# Patient Record
Sex: FEMALE | Race: WHITE | NOT HISPANIC OR LATINO | Employment: FULL TIME | URBAN - METROPOLITAN AREA
[De-identification: names, ages, dates, MRNs, and addresses within clinical notes are randomized per-mention and may not be internally consistent; named-entity substitution may affect disease eponyms.]

---

## 2023-07-20 ENCOUNTER — APPOINTMENT (OUTPATIENT)
Dept: LAB | Facility: LAB | Age: 53
End: 2023-07-20
Payer: COMMERCIAL

## 2023-07-20 LAB
ALANINE AMINOTRANSFERASE (SGPT) (U/L) IN SER/PLAS: 13 U/L (ref 7–45)
ALBUMIN (G/DL) IN SER/PLAS: 4.1 G/DL (ref 3.4–5)
ALKALINE PHOSPHATASE (U/L) IN SER/PLAS: 45 U/L (ref 33–110)
ANION GAP IN SER/PLAS: 14 MMOL/L (ref 10–20)
ASPARTATE AMINOTRANSFERASE (SGOT) (U/L) IN SER/PLAS: 17 U/L (ref 9–39)
BILIRUBIN TOTAL (MG/DL) IN SER/PLAS: 0.4 MG/DL (ref 0–1.2)
C REACTIVE PROTEIN (MG/L) IN SER/PLAS: 0.73 MG/DL
CALCIUM (MG/DL) IN SER/PLAS: 9.5 MG/DL (ref 8.6–10.6)
CARBON DIOXIDE, TOTAL (MMOL/L) IN SER/PLAS: 23 MMOL/L (ref 21–32)
CHLORIDE (MMOL/L) IN SER/PLAS: 107 MMOL/L (ref 98–107)
CREATININE (MG/DL) IN SER/PLAS: 0.69 MG/DL (ref 0.5–1.05)
ERYTHROCYTE DISTRIBUTION WIDTH (RATIO) BY AUTOMATED COUNT: 15.1 % (ref 11.5–14.5)
ERYTHROCYTE MEAN CORPUSCULAR HEMOGLOBIN CONCENTRATION (G/DL) BY AUTOMATED: 32 G/DL (ref 32–36)
ERYTHROCYTE MEAN CORPUSCULAR VOLUME (FL) BY AUTOMATED COUNT: 99 FL (ref 80–100)
ERYTHROCYTES (10*6/UL) IN BLOOD BY AUTOMATED COUNT: 4.14 X10E12/L (ref 4–5.2)
GFR FEMALE: >90 ML/MIN/1.73M2
GLUCOSE (MG/DL) IN SER/PLAS: 94 MG/DL (ref 74–99)
HEMATOCRIT (%) IN BLOOD BY AUTOMATED COUNT: 41 % (ref 36–46)
HEMOGLOBIN (G/DL) IN BLOOD: 13.1 G/DL (ref 12–16)
LEUKOCYTES (10*3/UL) IN BLOOD BY AUTOMATED COUNT: 6.6 X10E9/L (ref 4.4–11.3)
NRBC (PER 100 WBCS) BY AUTOMATED COUNT: 0 /100 WBC (ref 0–0)
PLATELETS (10*3/UL) IN BLOOD AUTOMATED COUNT: 288 X10E9/L (ref 150–450)
POTASSIUM (MMOL/L) IN SER/PLAS: 4.2 MMOL/L (ref 3.5–5.3)
PROTEIN TOTAL: 6.9 G/DL (ref 6.4–8.2)
SEDIMENTATION RATE, ERYTHROCYTE: 8 MM/H (ref 0–20)
SODIUM (MMOL/L) IN SER/PLAS: 140 MMOL/L (ref 136–145)
UREA NITROGEN (MG/DL) IN SER/PLAS: 18 MG/DL (ref 6–23)

## 2023-07-23 LAB
NIL(NEG) CONTROL SPOT COUNT: NORMAL
PANEL A SPOT COUNT: 0
PANEL B SPOT COUNT: 1
POS CONTROL SPOT COUNT: NORMAL
T-SPOT. TB INTERPRETATION: NEGATIVE

## 2023-10-19 PROBLEM — H02.889 MEIBOMIAN GLAND DYSFUNCTION (MGD): Status: ACTIVE | Noted: 2023-10-19

## 2023-10-19 PROBLEM — H00.12 CHALAZION OF RIGHT LOWER EYELID: Status: ACTIVE | Noted: 2023-10-19

## 2023-10-19 PROBLEM — H52.4 PRESBYOPIA: Status: ACTIVE | Noted: 2023-10-19

## 2023-10-19 PROBLEM — M18.9 CMC ARTHRITIS, THUMB, DEGENERATIVE: Status: ACTIVE | Noted: 2023-10-19

## 2023-10-19 PROBLEM — M43.06 PARS DEFECT OF LUMBAR SPINE: Status: ACTIVE | Noted: 2023-10-19

## 2023-10-19 PROBLEM — M70.62 GREATER TROCHANTERIC BURSITIS, LEFT: Status: ACTIVE | Noted: 2023-10-19

## 2023-10-19 PROBLEM — S30.0XXA LUMBAR CONTUSION: Status: ACTIVE | Noted: 2023-10-19

## 2023-10-19 PROBLEM — H52.01 HYPERMETROPIA OF RIGHT EYE: Status: ACTIVE | Noted: 2023-10-19

## 2023-10-19 PROBLEM — H10.509 BLEPHAROCONJUNCTIVITIS: Status: ACTIVE | Noted: 2023-10-19

## 2023-10-19 PROBLEM — R29.898 LEFT HAND WEAKNESS: Status: ACTIVE | Noted: 2023-10-19

## 2023-10-19 PROBLEM — K63.5 POLYP OF COLON: Status: ACTIVE | Noted: 2022-08-18

## 2023-10-19 PROBLEM — M19.90 INFLAMMATORY ARTHRITIS: Status: ACTIVE | Noted: 2023-10-19

## 2023-10-19 PROBLEM — M50.20 HERNIATION OF INTERVERTEBRAL DISC OF CERVICAL REGION: Status: ACTIVE | Noted: 2023-10-19

## 2023-10-19 PROBLEM — M06.031: Status: ACTIVE | Noted: 2023-10-19

## 2023-10-19 PROBLEM — M06.00 SERONEGATIVE RHEUMATOID ARTHRITIS (MULTI): Status: ACTIVE | Noted: 2022-03-30

## 2023-10-19 PROBLEM — K64.8 HEMORRHOIDS, INTERNAL: Status: ACTIVE | Noted: 2022-08-18

## 2023-10-19 RX ORDER — FOLIC ACID 0.4 MG
1 TABLET ORAL DAILY
COMMUNITY
End: 2024-01-26 | Stop reason: SDUPTHER

## 2023-10-19 RX ORDER — FOLIC ACID 1 MG/1
1 TABLET ORAL DAILY
COMMUNITY

## 2023-10-19 RX ORDER — TURMERIC 400 MG
1 CAPSULE ORAL DAILY
COMMUNITY
Start: 2023-07-28

## 2023-10-19 RX ORDER — LEVONORGESTREL / ETHINYL ESTRADIOL AND ETHINYL ESTRADIOL 150-30(84)
KIT ORAL
COMMUNITY
End: 2024-01-26 | Stop reason: ALTCHOICE

## 2023-10-19 RX ORDER — CIPROFLOXACIN HYDROCHLORIDE 3 MG/ML
SOLUTION/ DROPS OPHTHALMIC 2 TIMES DAILY
COMMUNITY
Start: 2022-10-20 | End: 2024-01-26 | Stop reason: ALTCHOICE

## 2023-10-19 RX ORDER — MELOXICAM 15 MG/1
1 TABLET ORAL DAILY
COMMUNITY

## 2023-10-19 RX ORDER — METHOTREXATE 2.5 MG/1
25 TABLET ORAL
COMMUNITY
End: 2024-05-28

## 2023-10-19 RX ORDER — LEVONORGESTREL / ETHINYL ESTRADIOL AND ETHINYL ESTRADIOL 150-30(84)
1 KIT ORAL DAILY
COMMUNITY
Start: 2023-05-01 | End: 2023-11-08 | Stop reason: SDUPTHER

## 2023-10-19 RX ORDER — LEVONORGESTREL / ETHINYL ESTRADIOL AND ETHINYL ESTRADIOL 150-30(84)
1 KIT ORAL DAILY
COMMUNITY
Start: 2018-09-12 | End: 2024-03-06 | Stop reason: SDUPTHER

## 2023-10-19 RX ORDER — UPADACITINIB 15 MG/1
15 TABLET, EXTENDED RELEASE ORAL DAILY
COMMUNITY
Start: 2022-08-08 | End: 2023-12-06

## 2023-10-20 ENCOUNTER — OFFICE VISIT (OUTPATIENT)
Dept: DERMATOLOGY | Facility: CLINIC | Age: 53
End: 2023-10-20
Payer: COMMERCIAL

## 2023-10-20 DIAGNOSIS — L81.4 LENTIGO: ICD-10-CM

## 2023-10-20 DIAGNOSIS — D48.5 NEOPLASM OF UNCERTAIN BEHAVIOR OF SKIN: Primary | ICD-10-CM

## 2023-10-20 DIAGNOSIS — L82.0 INFLAMED SEBORRHEIC KERATOSIS: ICD-10-CM

## 2023-10-20 DIAGNOSIS — L57.8 DIFFUSE PHOTODAMAGE OF SKIN: ICD-10-CM

## 2023-10-20 DIAGNOSIS — L30.4 INTERTRIGO: ICD-10-CM

## 2023-10-20 DIAGNOSIS — L82.1 SEBORRHEIC KERATOSIS: ICD-10-CM

## 2023-10-20 DIAGNOSIS — D22.5 MELANOCYTIC NEVUS OF TRUNK: ICD-10-CM

## 2023-10-20 PROCEDURE — 99204 OFFICE O/P NEW MOD 45 MIN: CPT | Performed by: DERMATOLOGY

## 2023-10-20 PROCEDURE — 88305 TISSUE EXAM BY PATHOLOGIST: CPT | Mod: TC,DER | Performed by: DERMATOLOGY

## 2023-10-20 PROCEDURE — 1036F TOBACCO NON-USER: CPT | Performed by: DERMATOLOGY

## 2023-10-20 PROCEDURE — 17110 DESTRUCTION B9 LES UP TO 14: CPT | Performed by: DERMATOLOGY

## 2023-10-20 PROCEDURE — 11301 SHAVE SKIN LESION 0.6-1.0 CM: CPT | Performed by: DERMATOLOGY

## 2023-10-20 PROCEDURE — 11302 SHAVE SKIN LESION 1.1-2.0 CM: CPT | Performed by: DERMATOLOGY

## 2023-10-20 PROCEDURE — 88305 TISSUE EXAM BY PATHOLOGIST: CPT | Performed by: DERMATOLOGY

## 2023-10-20 RX ORDER — KETOCONAZOLE 20 MG/G
CREAM TOPICAL 2 TIMES DAILY
Qty: 60 G | Refills: 11 | Status: SHIPPED | OUTPATIENT
Start: 2023-10-20 | End: 2023-11-17

## 2023-10-20 ASSESSMENT — DERMATOLOGY PATIENT ASSESSMENT
HAVE YOU HAD OR DO YOU HAVE A STAPH INFECTION: NO
DO YOU USE SUNSCREEN: OCCASIONALLY
DO YOU USE A TANNING BED: NO
ARE YOU ON BIRTH CONTROL: NO
DO YOU HAVE IRREGULAR MENSTRUAL CYCLES: NO
ARE YOU AN ORGAN TRANSPLANT RECIPIENT: NO
HAVE YOU HAD OR DO YOU HAVE VASCULAR DISEASE: NO
ARE YOU TRYING TO GET PREGNANT: NO
DO YOU HAVE ANY NEW OR CHANGING LESIONS: YES
WHERE ARE THESE NEW OR CHANGING LESIONS LOCATED: LOWER BACK

## 2023-10-20 ASSESSMENT — DERMATOLOGY QUALITY OF LIFE (QOL) ASSESSMENT
RATE HOW BOTHERED YOU ARE BY SYMPTOMS OF YOUR SKIN PROBLEM (EG, ITCHING, STINGING BURNING, HURTING OR SKIN IRRITATION): 0 - NEVER BOTHERED
DATE THE QUALITY-OF-LIFE ASSESSMENT WAS COMPLETED: 66767
WHAT SINGLE SKIN CONDITION LISTED BELOW IS THE PATIENT ANSWERING THE QUALITY-OF-LIFE ASSESSMENT QUESTIONS ABOUT: NONE OF THE ABOVE
ARE THERE EXCLUSIONS OR EXCEPTIONS FOR THE QUALITY OF LIFE ASSESSMENT: NO
RATE HOW EMOTIONALLY BOTHERED YOU ARE BY YOUR SKIN PROBLEM (FOR EXAMPLE, WORRY, EMBARRASSMENT, FRUSTRATION): 0 - NEVER BOTHERED
RATE HOW BOTHERED YOU ARE BY EFFECTS OF YOUR SKIN PROBLEMS ON YOUR ACTIVITIES (EG, GOING OUT, ACCOMPLISHING WHAT YOU WANT, WORK ACTIVITIES OR YOUR RELATIONSHIPS WITH OTHERS): 0 - NEVER BOTHERED

## 2023-10-20 ASSESSMENT — ITCH NUMERIC RATING SCALE: HOW SEVERE IS YOUR ITCHING?: 0

## 2023-10-20 ASSESSMENT — PATIENT GLOBAL ASSESSMENT (PGA): PATIENT GLOBAL ASSESSMENT: PATIENT GLOBAL ASSESSMENT:  1 - CLEAR

## 2023-10-20 NOTE — PROGRESS NOTES
Subjective     Adriana Lew is a 53 y.o. female who presents for the following: Suspicious Skin Lesion (On lower back scaling, scabbing, itching, and not healing).  She states the lesion on her lower back has been present for 1-2 months and intermittently becomes scaly, scabby, and itchy and has not healed.  She also notes a raised, scaly, itchy bump on the top of her right hand for the past few months.  Lastly, she notes red, irritated patches underneath her breasts, especially during the summer    Review of Systems:  No other skin or systemic complaints other than what is documented elsewhere in the note.    The following portions of the chart were reviewed this encounter and updated as appropriate:       Skin Cancer History  No skin cancer on file.    Specialty Problems          Dermatology Problems    Lumbar contusion       Past Dermatologic / Past Relevant Medical History:    No history of atypical nevi or skin cancer    Family History:    Mother - basal cell carcinoma  No family history of melanoma    Social History:    She states she went to [employer] for [employer] school and now works as an  in claims    Allergies:  Patient has no known allergies.    Current Medications / CAM's:    Current Outpatient Medications:     folic acid (Folvite) 1 mg tablet, Take 1 tablet (1 mg) by mouth once daily., Disp: , Rfl:     L norgest/e.estradioL-e.estrad (Daysee) 0.15 mg-30 mcg (84)/10 mcg (7) tablets,dose pack,3 month tablet, Take 1 tablet by mouth once daily., Disp: , Rfl:     meloxicam (Mobic) 15 mg tablet, Take 1 tablet (15 mg) by mouth once daily., Disp: , Rfl:     methotrexate (Trexall) 2.5 mg tablet, Take by mouth 1 (one) time per week., Disp: , Rfl:     turmeric 400 mg capsule, Take 1 capsule by mouth once daily., Disp: , Rfl:     upadacitinib ER (Rinvoq) 15 mg tablet extended release 24 hr, Take 1 tablet (15 mg) by mouth once daily., Disp: , Rfl:     ciprofloxacin (Ciloxan) 0.3 % ophthalmic solution, 2  times a day., Disp: , Rfl:     diclofenac sodium 1 % kit, Apply 1 g topically 3 times a day., Disp: , Rfl:     folic acid (Folvite) 400 mcg tablet, Take 1 tablet (0.4 mg) by mouth once daily., Disp: , Rfl:     ketoconazole (NIZOral) 2 % cream, Apply topically 2 times a day for 28 days. To affected areas of skin for 2-3 weeks, then once or twice weekly for maintenance; may repeat treatment as needed for flares, Disp: 60 g, Rfl: 11    L norgest/e.estradioL-e.estrad (Seasonique) 0.15 mg-30 mcg (84)/10 mcg (7) tablets,dose pack,3 month tablet, Take by mouth., Disp: , Rfl:     L norgest/e.estradioL-e.estrad (Seasonique) 0.15 mg-30 mcg (84)/10 mcg (7) tablets,dose pack,3 month tablet, Take 1 tablet by mouth once daily., Disp: , Rfl:      Objective   Well appearing patient in no apparent distress; mood and affect are within normal limits.    A full examination was performed including scalp, face, eyes, ears, nose, lips, neck, chest, axillae, abdomen, back, bilateral upper extremities, and bilateral lower extremities. All findings within normal limits unless otherwise noted below.    Assessment/Plan   1. Neoplasm of uncertain behavior of skin (4)  Mid Lower Back  8 mm dark brown and red, asymmetric macule with an asymmetric pigment network and irregular borders           Shave removal    Lesion length (cm):  0.8  Margin per side (cm):  0.2  Lesion diameter (cm):  1.2  Informed consent: discussed and consent obtained    Timeout: patient name, date of birth, surgical site, and procedure verified    Procedure prep:  Patient was prepped and draped  Anesthesia: the lesion was anesthetized in a standard fashion    Anesthetic:  1% lidocaine w/ epinephrine 1-100,000 local infiltration  Instrument used: flexible razor blade    Hemostasis achieved with: aluminum chloride    Outcome: patient tolerated procedure well    Post-procedure details: sterile dressing applied and wound care instructions given    Dressing type: bandage and  petrolatum      Staff Communication: Dermatology Local Anesthesia: 1 % Lidocaine / Epinephrine - Amount:0.5ml    Specimen 1 - Dermatopathology- DERM LAB  Differential Diagnosis: BCC v DN v melanoma v ISK  Check Margins Yes/No?:    Comments:    Dermpath Lab: Routine Histopathology (formalin-fixed tissue)    Right Mid-Lower Back  8 mm dark brown pigmented, asymmetric macule with an asymmetric pigment network and irregular borders           Shave removal    Lesion length (cm):  0.8  Margin per side (cm):  0.2  Lesion diameter (cm):  1.2  Informed consent: discussed and consent obtained    Timeout: patient name, date of birth, surgical site, and procedure verified    Procedure prep:  Patient was prepped and draped  Anesthesia: the lesion was anesthetized in a standard fashion    Anesthetic:  1% lidocaine w/ epinephrine 1-100,000 local infiltration  Instrument used: flexible razor blade    Hemostasis achieved with: aluminum chloride    Outcome: patient tolerated procedure well    Post-procedure details: sterile dressing applied and wound care instructions given    Dressing type: bandage and petrolatum      Staff Communication: Dermatology Local Anesthesia: 1 % Lidocaine / Epinephrine - Amount:0.5ml    Specimen 2 - Dermatopathology- DERM LAB  Differential Diagnosis: DN  Check Margins Yes/No?:    Comments:    Dermpath Lab: Routine Histopathology (formalin-fixed tissue)    Right Medial Mid-Back  6 mm dark brown pigmented, asymmetric macule with an asymmetric pigment network and irregular borders           Shave removal    Lesion length (cm):  0.6  Margin per side (cm):  0.2  Lesion diameter (cm):  1  Informed consent: discussed and consent obtained    Timeout: patient name, date of birth, surgical site, and procedure verified    Procedure prep:  Patient was prepped and draped  Anesthesia: the lesion was anesthetized in a standard fashion    Anesthetic:  1% lidocaine w/ epinephrine 1-100,000 local infiltration  Instrument  used: flexible razor blade    Hemostasis achieved with: aluminum chloride    Outcome: patient tolerated procedure well    Post-procedure details: sterile dressing applied and wound care instructions given    Dressing type: bandage and petrolatum      Staff Communication: Dermatology Local Anesthesia: 1 % Lidocaine / Epinephrine - Amount:0.5ml    Specimen 3 - Dermatopathology- DERM LAB  Differential Diagnosis: SK v DN  Check Margins Yes/No?:    Comments:    Dermpath Lab: Routine Histopathology (formalin-fixed tissue)    Left Lateral Upper Back  6 mm dark brown pigmented, asymmetric macule with an asymmetric pigment network and irregular borders           Shave removal    Lesion length (cm):  0.6  Margin per side (cm):  0.2  Lesion diameter (cm):  1  Informed consent: discussed and consent obtained    Timeout: patient name, date of birth, surgical site, and procedure verified    Procedure prep:  Patient was prepped and draped  Anesthesia: the lesion was anesthetized in a standard fashion    Anesthetic:  1% lidocaine w/ epinephrine 1-100,000 local infiltration  Instrument used: flexible razor blade    Hemostasis achieved with: aluminum chloride    Outcome: patient tolerated procedure well    Post-procedure details: sterile dressing applied and wound care instructions given    Dressing type: bandage and petrolatum      Staff Communication: Dermatology Local Anesthesia: 1 % Lidocaine / Epinephrine - Amount:0.5ml    Specimen 4 - Dermatopathology- DERM LAB  Differential Diagnosis: DN  Check Margins Yes/No?:    Comments:    Dermpath Lab: Routine Histopathology (formalin-fixed tissue)    2. Inflamed seborrheic keratosis  Right Hand - Posterior  On the patient's right mid dorsal hand, there is a 7 mm erythematous and light brown-colored, hyperkeratotic, stuck-on appearing papule with a surrounding rim of erythema    Inflamed Seborrheic Keratosis -right mid dorsal hand.  The benign nature of this lesion was discussed with the  patient today and reassurance provided.  Given the history the patient provides of frequent irritation and associated symptoms as well as its inflamed appearance on exam today, I offered to treat this lesion with liquid nitrogen cryotherapy.  The patient expressed understanding, is in agreement with this plan, and wishes to proceed with cryotherapy today.    Destr of lesion - Right Hand - Posterior  Complexity: simple    Destruction method: cryotherapy    Informed consent: discussed and consent obtained    Lesion destroyed using liquid nitrogen: Yes    Cryotherapy cycles:  2  Outcome: patient tolerated procedure well with no complications    Post-procedure details: wound care instructions given      3. Melanocytic nevus of trunk  Scattered on the patient's face, neck, trunk, and extremities, there are multiple small, round- to oval-shaped, brown-pigmented and pink-colored, symmetric, uniform-appearing macules and dome-shaped papules    Clinically benign- to slightly atypical-appearing nevi - the clinically benign- to slightly atypical-appearing nature of the remainder of the patient's nevi was discussed with the patient today.  None of the patient's nevi, with the exception of the ones noted above, meet threshold for biopsy today.  I emphasized the importance of performing monthly self-skin exams using the ABCDs of monitoring moles, which were reviewed with the patient today and an informational hand-out provided.  I also emphasized the importance of sun avoidance and sun protection with daily sunscreen use.    4. Seborrheic keratosis  Scattered on the patient's face, neck, trunk, and extremities, there are multiple tan- to light brown-colored, hyperkeratotic, stuck-on appearing papules of varying size and shape    Seborrheic Keratoses - the benign nature of these lesions was discussed with the patient today and reassurance provided.  No treatment is medically indicated for the noninflamed SKs at this time.    5.  Lentigo  Photodistributed  Multiple tan- to light brown-colored, round- to oval-shaped, symmetric and uniform-appearing macules and small patches consistent with lentigines scattered in sun-exposed areas.    Solar Lentigines and photodamage.  The clinically benign-appearing nature of these lesions and their relation to chronic sun exposure were discussed with the patient today and reassurance provided.  None of these lesions meet threshold for biopsy today, and thus no treatment is medically indicated for these lesions at this time.  The signs and symptoms of skin cancer were reviewed and the patient was advised to practice sun protection and sun avoidance, use daily sunscreen, and perform regular self skin exams.  The patient was instructed to monitor these lesions for any changes, such as in size, shape, or color, or associated symptoms and to call our office to schedule a return visit for re-evaluation if any such changes or symptoms are noticed in the future.  The patient expressed understanding and is in agreement with this plan.    6. Intertrigo  Left Inframammary Fold  In the patient's bilateral inframammary folds, there are red, moist, slightly scaly patches located in skin folds.    Intertrigo - bilateral infra-mammary folds.  The potentially chronic and intermittently flaring nature of this condition, which likely involves colonization with Candidal yeast, and treatment options were discussed extensively with the patient today.  At this time, I recommend topical anti-fungal therapy with Ketoconazole 2% cream, which the patient was instructed to apply twice daily to the affected areas for the next 2-3 weeks, followed by taper to weekends only with use of over-the-counter Zeasorb AF powder once daily for maintenance; the patient may repeat treatment in a similar fashion as needed for future flares.  The risks, benefits, and side effects of these medications were discussed.  The patient expressed  understanding and is in agreement with this plan.    ketoconazole (NIZOral) 2 % cream - Left Inframammary Fold  Apply topically 2 times a day for 28 days. To affected areas of skin for 2-3 weeks, then once or twice weekly for maintenance; may repeat treatment as needed for flares    7. Diffuse photodamage of skin  Photodistributed  Diffuse photodamage with actinic changes with telangiectasia and mottled pigmentation in sun-exposed areas.    Photodamage.  The signs and symptoms of skin cancer were reviewed and the patient was advised to practice sun protection and sun avoidance, use daily sunscreen, and perform regular self skin exams.  Sun protection was discussed, including avoiding the mid-day sun, wearing a sunscreen with SPF at least 50, and stressing the need for reapplication of sunscreen and applying more than they think they need.

## 2023-10-26 LAB
LABORATORY COMMENT REPORT: NORMAL
PATH REPORT.FINAL DX SPEC: NORMAL
PATH REPORT.GROSS SPEC: NORMAL
PATH REPORT.MICROSCOPIC SPEC OTHER STN: NORMAL
PATH REPORT.RELEVANT HX SPEC: NORMAL
PATH REPORT.TOTAL CANCER: NORMAL

## 2023-11-08 DIAGNOSIS — Z30.41 ENCOUNTER FOR SURVEILLANCE OF CONTRACEPTIVE PILLS: Primary | ICD-10-CM

## 2023-11-08 RX ORDER — LEVONORGESTREL / ETHINYL ESTRADIOL AND ETHINYL ESTRADIOL 150-30(84)
1 KIT ORAL DAILY
Qty: 91 TABLET | Refills: 0 | Status: SHIPPED | OUTPATIENT
Start: 2023-11-08 | End: 2024-01-03

## 2023-12-06 DIAGNOSIS — M06.031 RHEUMATOID ARTHRITIS INVOLVING RIGHT WRIST WITH NEGATIVE RHEUMATOID FACTOR (MULTI): Primary | ICD-10-CM

## 2023-12-06 RX ORDER — UPADACITINIB 15 MG/1
15 TABLET, EXTENDED RELEASE ORAL DAILY
Qty: 90 TABLET | Refills: 1 | Status: SHIPPED | OUTPATIENT
Start: 2023-12-06 | End: 2024-06-03

## 2023-12-26 DIAGNOSIS — Z30.41 ENCOUNTER FOR SURVEILLANCE OF CONTRACEPTIVE PILLS: ICD-10-CM

## 2024-01-03 RX ORDER — LEVONORGESTREL / ETHINYL ESTRADIOL AND ETHINYL ESTRADIOL 150-30(84)
1 KIT ORAL DAILY
Qty: 91 TABLET | Refills: 0 | Status: SHIPPED | OUTPATIENT
Start: 2024-01-03

## 2024-01-15 ENCOUNTER — PROCEDURE VISIT (OUTPATIENT)
Dept: DERMATOLOGY | Facility: CLINIC | Age: 54
End: 2024-01-15
Payer: COMMERCIAL

## 2024-01-15 ENCOUNTER — TELEPHONE (OUTPATIENT)
Dept: RHEUMATOLOGY | Facility: CLINIC | Age: 54
End: 2024-01-15

## 2024-01-15 ENCOUNTER — APPOINTMENT (OUTPATIENT)
Dept: LAB | Facility: LAB | Age: 54
End: 2024-01-15
Payer: COMMERCIAL

## 2024-01-15 DIAGNOSIS — M06.031 RHEUMATOID ARTHRITIS INVOLVING RIGHT WRIST WITH NEGATIVE RHEUMATOID FACTOR (MULTI): Primary | ICD-10-CM

## 2024-01-15 DIAGNOSIS — C44.519 BASAL CELL CARCINOMA (BCC) OF SKIN OF OTHER PART OF TORSO: ICD-10-CM

## 2024-01-15 PROCEDURE — 88305 TISSUE EXAM BY PATHOLOGIST: CPT | Performed by: DERMATOLOGY

## 2024-01-15 PROCEDURE — 12032 INTMD RPR S/A/T/EXT 2.6-7.5: CPT | Performed by: STUDENT IN AN ORGANIZED HEALTH CARE EDUCATION/TRAINING PROGRAM

## 2024-01-15 PROCEDURE — 11602 EXC TR-EXT MAL+MARG 1.1-2 CM: CPT | Performed by: STUDENT IN AN ORGANIZED HEALTH CARE EDUCATION/TRAINING PROGRAM

## 2024-01-15 NOTE — PROGRESS NOTES
Excision Operative Note    Date of Surgery:  1/15/2024  Surgeon:  Temo Kasper MD  Office Location: [address]  [facility] [address]  Dept: [phone]  Dept Fax: [phone]  Referring Provider: Kleber Milan MD  [address] [place] [address]   [facility] [address]  [place] [id number]    Subjective   Adriana Lew is a 53 y.o. female who presents for the following: Excision (Mid Lower Back) for basal cell carcinoma.    According to the patient, the lesion has been present for approximately greater than 1 year at the time of diagnosis.  The lesion is not causing symptoms.  The lesion has not been treated previously.    The patient does not have a pacemaker / defibrillator.  The patient does not have a heart valve / joint replacement.    The patient is not on blood thinners.   The patient does not have a history of hepatitis B or C.  The patient does not have a history of HIV.  The patient does have a history of immunosuppression (e.g. organ transplantation, malignancy, medications) - history of RA, on methotrexate. Rinvoq.     The following portions of the chart were reviewed this encounter and updated as appropriate:         Assessment/Plan   Pre-procedure:   Obtained informed consent: written from patient  The surgical site was identified and confirmed with the patient.     Intra-operative:   Audible time out called at : 1:24 PM 01/15/24  by: Adri Cole MA   Verified patient name, birthdate, site, specimen bottle label & requisition.    The planned procedure(s) was again reviewed with the patient. The risks of bleeding, infection, nerve damage and scarring were reviewed. The patient identity, surgical site, and planned procedure(s) were verified.     Basal cell carcinoma (BCC) of skin of other part of torso  Mid Lower Back    Skin excision    Lesion length (cm):  0.9  Lesion width (cm):  0.9  Margin per side (cm):  0.4  Total excision diameter (cm):   1.7  Informed consent: discussed and consent obtained    Timeout: patient name, date of birth, surgical site, and procedure verified    Procedure prep:  Patient was prepped and draped  Anesthesia: the lesion was anesthetized in a standard fashion    Anesthetic:  1% lidocaine w/ epinephrine 1-100,000 local infiltration  Instrument used: #15 blade    Hemostasis achieved with: electrodesiccation    Outcome: patient tolerated procedure well with no complications    Post-procedure details: sterile dressing applied and wound care instructions given    Dressing type: pressure dressing    Additional details:  The nature of the diagnosis was explained. The lesion is a skin cancer.  It is locally aggressive but has a very low to non-existent risk of spreading.  The condition is associated with sun exposure.  Warning signs of non-melanoma skin cancer discussed. Patient was instructed to perform monthly self skin examination.  We recommended that the patient have regular full skin exams given an increased risk of subsequent skin cancers. The patient was instructed to use sun protective behaviors including use of broad spectrum sunscreens and sun protective clothing to reduce risk of skin cancers.  Excision was discussed with the patient. The risks, benefits and potential adverse effects were reviewed. Discussion included but was not limited to the cure rate, relative cost, wound care requirements, activity restrictions, likely scar outcome and time to heal were reviewed. It was explained that the scar would be longer than the original lesion.  The patient elected to proceed with exicision today.    Skin repair  Complexity:  Intermediate  Final length (cm):  4.4  Informed consent: discussed and consent obtained    Timeout: patient name, date of birth, surgical site, and procedure verified    Procedure prep:  Patient prepped in sterile fashion  Anesthesia: the lesion was anesthetized in a standard fashion    Anesthetic:  1%  lidocaine w/ epinephrine 1-100,000 local infiltration  Reason for type of repair: reduce tension to allow closure    Undermining: edges undermined    Subcutaneous layers (deep stitches):   Suture size:  3-0  Suture type: Vicryl (polyglactin 910)    Stitches:  Buried vertical mattress  Fine/surface layer approximation (top stitches):   Suture size:  5-0  Suture type: fast-absorbing plain gut    Hemostasis achieved with: electrodesiccation  Outcome: patient tolerated procedure well with no complications    Post-procedure details: sterile dressing applied and wound care instructions given    Dressing type: pressure dressing      Staff Communication: Dermatology Local Anesthesia: Site Location: Mid Lower Back 1 % Lidocaine / Epinephrine - Amount: 12.00 cc's    Specimen 1 - Dermatopathology- DERM LAB  Differential Diagnosis: r/o residual BCC  Check Margins Yes/No?:  yes  Comments:  12:00 Indication Suture at the Left tip  Reference Accession #[id number]  Dermpath Lab: Routine Histopathology (formalin-fixed tissue)      Intermediate Linear Repair:  Given the location and size of the defect, it was determined that an intermediate layered linear closure was required to restore normal anatomy and function. The repair is an intermediate closure as two layers of sutures were required. The defect was undermined extensively at the level of the subcutaneous plane. Standing cutaneous cones were removed using Burow's triangles. The wound edges were brought into close approximation with buried vertical mattress sutures. The remainder of the wound was then closed with epidermal top sutures.    The final repair measured 4.4 cm    Wound care was discussed, and the patient was given written post-operative wound care instructions.      The patient will follow up with Temo Kasper MD as needed for any post operative problems or concerns, and will follow up with their primary dermatologist as scheduled.

## 2024-01-17 ENCOUNTER — LAB (OUTPATIENT)
Dept: LAB | Facility: LAB | Age: 54
End: 2024-01-17
Payer: COMMERCIAL

## 2024-01-17 DIAGNOSIS — M06.031 RHEUMATOID ARTHRITIS INVOLVING RIGHT WRIST WITH NEGATIVE RHEUMATOID FACTOR (MULTI): ICD-10-CM

## 2024-01-17 LAB
ALBUMIN SERPL BCP-MCNC: 4 G/DL (ref 3.4–5)
ALP SERPL-CCNC: 38 U/L (ref 33–110)
ALT SERPL W P-5'-P-CCNC: 11 U/L (ref 7–45)
ANION GAP SERPL CALC-SCNC: 11 MMOL/L (ref 10–20)
AST SERPL W P-5'-P-CCNC: 17 U/L (ref 9–39)
BILIRUB SERPL-MCNC: 0.3 MG/DL (ref 0–1.2)
BUN SERPL-MCNC: 18 MG/DL (ref 6–23)
CALCIUM SERPL-MCNC: 9.1 MG/DL (ref 8.6–10.6)
CHLORIDE SERPL-SCNC: 107 MMOL/L (ref 98–107)
CO2 SERPL-SCNC: 26 MMOL/L (ref 21–32)
CREAT SERPL-MCNC: 0.78 MG/DL (ref 0.5–1.05)
CRP SERPL-MCNC: 0.35 MG/DL
EGFRCR SERPLBLD CKD-EPI 2021: >90 ML/MIN/1.73M*2
ERYTHROCYTE [DISTWIDTH] IN BLOOD BY AUTOMATED COUNT: 15.2 % (ref 11.5–14.5)
ERYTHROCYTE [SEDIMENTATION RATE] IN BLOOD BY WESTERGREN METHOD: 11 MM/H (ref 0–30)
GLUCOSE SERPL-MCNC: 85 MG/DL (ref 74–99)
HCT VFR BLD AUTO: 39 % (ref 36–46)
HGB BLD-MCNC: 13.4 G/DL (ref 12–16)
MCH RBC QN AUTO: 32.3 PG (ref 26–34)
MCHC RBC AUTO-ENTMCNC: 34.4 G/DL (ref 32–36)
MCV RBC AUTO: 94 FL (ref 80–100)
NRBC BLD-RTO: 0 /100 WBCS (ref 0–0)
PLATELET # BLD AUTO: 323 X10*3/UL (ref 150–450)
POTASSIUM SERPL-SCNC: 4 MMOL/L (ref 3.5–5.3)
PROT SERPL-MCNC: 6.5 G/DL (ref 6.4–8.2)
RBC # BLD AUTO: 4.15 X10*6/UL (ref 4–5.2)
SODIUM SERPL-SCNC: 140 MMOL/L (ref 136–145)
WBC # BLD AUTO: 6.2 X10*3/UL (ref 4.4–11.3)

## 2024-01-17 PROCEDURE — 85652 RBC SED RATE AUTOMATED: CPT

## 2024-01-17 PROCEDURE — 80053 COMPREHEN METABOLIC PANEL: CPT

## 2024-01-17 PROCEDURE — 36415 COLL VENOUS BLD VENIPUNCTURE: CPT

## 2024-01-17 PROCEDURE — 86140 C-REACTIVE PROTEIN: CPT

## 2024-01-17 PROCEDURE — 85027 COMPLETE CBC AUTOMATED: CPT

## 2024-01-19 ENCOUNTER — APPOINTMENT (OUTPATIENT)
Dept: DERMATOLOGY | Facility: CLINIC | Age: 54
End: 2024-01-19
Payer: COMMERCIAL

## 2024-01-26 ENCOUNTER — OFFICE VISIT (OUTPATIENT)
Dept: RHEUMATOLOGY | Facility: CLINIC | Age: 54
End: 2024-01-26
Payer: COMMERCIAL

## 2024-01-26 VITALS
SYSTOLIC BLOOD PRESSURE: 130 MMHG | WEIGHT: 177.8 LBS | HEART RATE: 81 BPM | BODY MASS INDEX: 28.57 KG/M2 | DIASTOLIC BLOOD PRESSURE: 85 MMHG | HEIGHT: 66 IN

## 2024-01-26 DIAGNOSIS — M06.031 RHEUMATOID ARTHRITIS INVOLVING RIGHT WRIST WITH NEGATIVE RHEUMATOID FACTOR (MULTI): Primary | ICD-10-CM

## 2024-01-26 DIAGNOSIS — Z79.899 ENCOUNTER FOR LONG-TERM (CURRENT) USE OF MEDICATIONS: ICD-10-CM

## 2024-01-26 PROCEDURE — 99213 OFFICE O/P EST LOW 20 MIN: CPT | Performed by: INTERNAL MEDICINE

## 2024-01-26 PROCEDURE — 1036F TOBACCO NON-USER: CPT | Performed by: INTERNAL MEDICINE

## 2024-01-26 RX ORDER — KETOCONAZOLE 20 MG/G
1 CREAM TOPICAL 2 TIMES DAILY PRN
COMMUNITY
Start: 2023-11-19

## 2024-01-26 ASSESSMENT — ENCOUNTER SYMPTOMS
DYSURIA: 0
LOSS OF SENSATION IN FEET: 0
ADENOPATHY: 0
SLEEP DISTURBANCE: 0
OCCASIONAL FEELINGS OF UNSTEADINESS: 0
HEADACHES: 0
DIZZINESS: 0
DEPRESSION: 0
SHORTNESS OF BREATH: 0
ABDOMINAL PAIN: 0
FATIGUE: 0

## 2024-01-26 NOTE — PROGRESS NOTES
Subjective   Patient ID: Adriana Lew is a 53 y.o. female who presents for Follow-up (6 mo fuv).  HPI  Patient with history of seronegative inflammatory arthritis.  Initially was in the right hand and now on the left hand.  Previous medications have included Xeljanz.    At her last visit on 7/20/2023 we had her continue with Rinvoq and methotrexate.  She continues to be stable.  She does have some mild issues on the lateral aspect of her left wrist.  The right wrist has been fine.  Denies any infections, breathing problems, stomach issues.    Unfortunately was found to have a basal cell cancer taken off her back you also had a couple of precancerous lesions.      Review of Systems   Constitutional:  Negative for fatigue.   HENT:  Negative for mouth sores.    Eyes:  Negative for visual disturbance.   Respiratory:  Negative for shortness of breath.    Cardiovascular:  Negative for chest pain.   Gastrointestinal:  Negative for abdominal pain.   Genitourinary:  Negative for dysuria.   Musculoskeletal:         Per HPI   Skin:  Negative for rash.        Had basal cell cancer removed on her lower back and had a couple of precancerous lesions taken off.   Neurological:  Negative for dizziness and headaches.   Hematological:  Negative for adenopathy.   Psychiatric/Behavioral:  Negative for sleep disturbance.        Objective   Physical Exam  GEN: NAD A&O x3 appropriate affect  EYES: no conjunctival redness  SKIN: no rashes on exposed skin, Band-Aid on lower back  PULSES: +radials  MSK: No swelling in the right DIP PIP MCP wrist  No large swelling in the lateral wrist like she had previously no swelling in the MCPs.  Assessment/Plan        Positive REAGAN with inflammatory arthritis now in the left hand.   -Has been stable with Rinvoq and methotrexate with her symptoms but recently was found to have basal cell cancer on her lower back and also precancerous lesions.  We discussed possibility of increase in skin cancers.  Will  keep an eye on this and if there seems to be an increase we may have to discontinue her Rinvoq.  -Reviewed her recent blood work      We will have her come back again in 6 months or as needed.       Loretta Rivers MD 01/26/24 10:12 AM

## 2024-01-27 ENCOUNTER — TELEPHONE (OUTPATIENT)
Dept: DERMATOLOGY | Facility: HOSPITAL | Age: 54
End: 2024-01-27
Payer: COMMERCIAL

## 2024-01-27 NOTE — TELEPHONE ENCOUNTER
"TELEPHONE ENCOUNTER  1/27/2024  Adriana Lew  [phone]    Reason for Call:  - Concern for nonhealing wound. Patient states she has noticed changed in the wound in the recent days. She states the wound was healing fine at first, and in the last few days noticed the right side of the wound having \"holes\" in the area. She denies fever, chills. She states there is \"dark brown\" on the bandage when she changes it.    Patient advised:  -  I discussed the patient with Dr. Jimenez. I instructed the patient to make sure she is using significant amount of vaseline to prevent scabbing of the wound, and to also make sure the white part of the bandaid is covering the whole wound. I also instructed her to not engage in strenuous activity for at least another week. We discussed that sitting causes stretching in the area, and soreness can be normal from this position. Patient expressed understanding.     Dariela Jacob, DO    "

## 2024-03-06 ENCOUNTER — OFFICE VISIT (OUTPATIENT)
Dept: OBSTETRICS AND GYNECOLOGY | Facility: CLINIC | Age: 54
End: 2024-03-06
Payer: COMMERCIAL

## 2024-03-06 VITALS
WEIGHT: 179 LBS | DIASTOLIC BLOOD PRESSURE: 72 MMHG | BODY MASS INDEX: 29.82 KG/M2 | HEIGHT: 65 IN | SYSTOLIC BLOOD PRESSURE: 118 MMHG

## 2024-03-06 DIAGNOSIS — Z30.41 ENCOUNTER FOR SURVEILLANCE OF CONTRACEPTIVE PILLS: ICD-10-CM

## 2024-03-06 DIAGNOSIS — Z01.419 ENCOUNTER FOR GYNECOLOGICAL EXAMINATION WITHOUT ABNORMAL FINDING: Primary | ICD-10-CM

## 2024-03-06 DIAGNOSIS — N91.2 AMENORRHEA: ICD-10-CM

## 2024-03-06 PROCEDURE — 1036F TOBACCO NON-USER: CPT | Performed by: OBSTETRICS & GYNECOLOGY

## 2024-03-06 PROCEDURE — 99396 PREV VISIT EST AGE 40-64: CPT | Performed by: OBSTETRICS & GYNECOLOGY

## 2024-03-06 RX ORDER — LEVONORGESTREL / ETHINYL ESTRADIOL AND ETHINYL ESTRADIOL 150-30(84)
1 KIT ORAL DAILY
Qty: 91 TABLET | Refills: 3 | Status: SHIPPED | OUTPATIENT
Start: 2024-03-06

## 2024-03-06 NOTE — PROGRESS NOTES
Subjective   Adriana Lew is a 53 y.o. female here for a routine exam.  She continues on her continuous use pills and has light cycles every 3 months.  There is no dysuria, no change in bowel habits, no pelvic pain or vaginal discharge.    She does mention an occasional intermittent itchy spot on her right perineal area.  She is current on her colonoscopy.  Personal health questionnaire reviewed: yes.     Gynecologic History  Patient's last menstrual period was 01/01/2024 (approximate).  Contraception: OCP (estrogen/progesterone)  Last Pap: 1/12/22. Results were: normal  Last mammogram: 9/27/23. Results were: normal    Obstetric History  OB History   No obstetric history on file.       Objective   Constitutional: Alert and in no acute distress. Well developed, well nourished.   Head and Face: Head and face: Normal.    Eyes: Normal external exam - nonicteric sclera, extraocular movements intact (EOMI) and no ptosis.   Neck: No neck asymmetry. Supple. Thyroid not enlarged and there were no palpable thyroid nodules.    Pulmonary: No respiratory distress.   Chest: Breasts: Normal appearance, no nipple discharge and no skin changes. Palpation of breasts and axillae: No palpable mass and no axillary lymphadenopathy.   Abdomen: Soft nontender; no abdominal mass palpated. No organomegaly. No hernias.   Genitourinary: External genitalia: Normal.  no lesions or skin changes on the right perineal area.  No inguinal lymphadenopathy. Bartholin's Urethral and Skenes Glands: Normal. Urethra: Normal.  Bladder: Normal on palpation. Vagina: Normal. Cervix: Normal.  Uterus: Normal.  Right Adnexa/parametria: Normal.  Left Adnexa/parametria: Normal.  Inspection of Perianal Area: Normal.   Musculoskeletal: No joint swelling seen, normal movements of all extremities.   Skin: Normal skin color and pigmentation, normal skin turgor, and no rash.   Neurologic: Non-focal. Grossly intact.   Psychiatric: Alert and oriented x 3. Affect normal  to patient baseline. Mood: Appropriate.  Physical Exam     Assessment/Plan   Healthy female exam.  This is a 53-year-old female with a normal exam.  No Pap was sent, she is high risk HPV negative.    Her routine mammogram was ordered with tomosynthesis, it is due in September 2024.    We discussed checking an FSH at the end of her placebo week to evaluate for menopause.  I will see her routinely in 1 year.  Mammogram ordered.

## 2024-03-15 ENCOUNTER — LAB (OUTPATIENT)
Dept: LAB | Facility: LAB | Age: 54
End: 2024-03-15
Payer: COMMERCIAL

## 2024-03-15 DIAGNOSIS — N91.2 AMENORRHEA: ICD-10-CM

## 2024-03-15 LAB
FSH SERPL-ACNC: 1.5 IU/L
LH SERPL-ACNC: <0.1 IU/L

## 2024-03-15 PROCEDURE — 83001 ASSAY OF GONADOTROPIN (FSH): CPT

## 2024-03-15 PROCEDURE — 83002 ASSAY OF GONADOTROPIN (LH): CPT

## 2024-03-15 PROCEDURE — 36415 COLL VENOUS BLD VENIPUNCTURE: CPT

## 2024-04-29 ENCOUNTER — OFFICE VISIT (OUTPATIENT)
Dept: DERMATOLOGY | Facility: CLINIC | Age: 54
End: 2024-04-29
Payer: COMMERCIAL

## 2024-04-29 DIAGNOSIS — L82.1 SEBORRHEIC KERATOSIS: ICD-10-CM

## 2024-04-29 DIAGNOSIS — L73.9 FOLLICULITIS: ICD-10-CM

## 2024-04-29 DIAGNOSIS — D18.01 HEMANGIOMA OF SKIN: ICD-10-CM

## 2024-04-29 DIAGNOSIS — D22.5 MELANOCYTIC NEVUS OF TRUNK: Primary | ICD-10-CM

## 2024-04-29 DIAGNOSIS — Z85.828 HISTORY OF NONMELANOMA SKIN CANCER: ICD-10-CM

## 2024-04-29 DIAGNOSIS — D48.5 NEOPLASM OF UNCERTAIN BEHAVIOR OF SKIN: ICD-10-CM

## 2024-04-29 DIAGNOSIS — L30.4 INTERTRIGO: ICD-10-CM

## 2024-04-29 DIAGNOSIS — L57.8 DIFFUSE PHOTODAMAGE OF SKIN: ICD-10-CM

## 2024-04-29 PROCEDURE — 88342 IMHCHEM/IMCYTCHM 1ST ANTB: CPT | Performed by: DERMATOLOGY

## 2024-04-29 PROCEDURE — 11301 SHAVE SKIN LESION 0.6-1.0 CM: CPT | Performed by: DERMATOLOGY

## 2024-04-29 PROCEDURE — 99214 OFFICE O/P EST MOD 30 MIN: CPT | Performed by: DERMATOLOGY

## 2024-04-29 PROCEDURE — 11302 SHAVE SKIN LESION 1.1-2.0 CM: CPT | Performed by: DERMATOLOGY

## 2024-04-29 PROCEDURE — 88305 TISSUE EXAM BY PATHOLOGIST: CPT | Performed by: DERMATOLOGY

## 2024-04-29 ASSESSMENT — DERMATOLOGY PATIENT ASSESSMENT
ARE YOU ON BIRTH CONTROL: NO
ARE YOU AN ORGAN TRANSPLANT RECIPIENT: NO
DO YOU HAVE ANY NEW OR CHANGING LESIONS: NO
DO YOU HAVE IRREGULAR MENSTRUAL CYCLES: NO
DO YOU USE A TANNING BED: NO
ARE YOU TRYING TO GET PREGNANT: NO
DO YOU USE SUNSCREEN: DAILY

## 2024-04-29 ASSESSMENT — ITCH NUMERIC RATING SCALE: HOW SEVERE IS YOUR ITCHING?: 0

## 2024-04-29 ASSESSMENT — DERMATOLOGY QUALITY OF LIFE (QOL) ASSESSMENT: ARE THERE EXCLUSIONS OR EXCEPTIONS FOR THE QUALITY OF LIFE ASSESSMENT: NO

## 2024-04-30 NOTE — PROGRESS NOTES
Subjective     Adriana Lew is a 53 y.o. female who presents for the following: Skin Check.  She notes intermittent red, irritated patches underneath her breasts, especially after sweating.  She denies any new, changing, or concerning skin lesions since her last visit; no bleeding, itching, or burning lesions.      Review of Systems:  No other skin or systemic complaints other than what is documented elsewhere in the note.    The following portions of the chart were reviewed this encounter and updated as appropriate:       Skin Cancer History  Biopsy Date Type Location Status   10/20/23 BCC Mid Lower Back Refer Mohs/Surgeon     Specialty Problems          Dermatology Problems    Lumbar contusion       Past Dermatologic / Past Relevant Medical History:    - history of BCC on mid-lower back diagnosed on 10/20/23 s/p wide local excision with 4-mm margins by Dr. 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 for law school and now works as an  in [place]    Allergies:  Patient has no known allergies.    Current Medications / CAM's:    Current Outpatient Medications:     folic acid (Folvite) 1 mg tablet, Take 1 tablet (1 mg) by mouth once daily., Disp: , Rfl:     ketoconazole (NIZOral) 2 % cream, Apply 1 Application topically 2 times a day as needed for itching, irritation or rash., Disp: , Rfl:     L norgest/e.estradioL-e.estrad (Daysee) 0.15 mg-30 mcg (84)/10 mcg (7) tablets,dose pack,3 month tablet, Take 1 tablet by mouth once daily., Disp: 91 tablet, Rfl: 3    methotrexate (Trexall) 2.5 mg tablet, Take 10 tablets (25 mg total) by mouth 1 (one) time per week., Disp: ,  Rfl:     NON FORMULARY, Take 1 each by mouth once daily. Moringa 6000 mg, Disp: , Rfl:     turmeric 400 mg capsule, Take 1 capsule by mouth once daily., Disp: , Rfl:     upadacitinib ER (Rinvoq) 15 mg tablet extended release 24 hr, Take 1 tablet (15 mg) by mouth once daily., Disp: 90 tablet, Rfl: 1    L norgest/e.estradioL-e.estrad (Seasonique) 0.15 mg-30 mcg (84)/10 mcg (7) tablets,dose pack,3 month tablet, TAKE 1 TABLET BY MOUTH EVERY DAY (Patient not taking: Reported on 1/26/2024), Disp: 91 tablet, Rfl: 0    meloxicam (Mobic) 15 mg tablet, Take 1 tablet (15 mg) by mouth once daily., Disp: , Rfl:      Objective   Well appearing patient in no apparent distress; mood and affect are within normal limits.    A full examination was performed including scalp, face, eyes, ears, nose, lips, neck, chest, axillae, abdomen, back, bilateral upper extremities, and bilateral lower extremities. All findings within normal limits unless otherwise noted below.    Assessment/Plan   1. Melanocytic nevus of trunk  Scattered on the patient's face, neck, trunk, and extremities, there are many small, round- to oval-shaped, brown-pigmented and pink-colored, symmetric, uniform-appearing macules and dome-shaped papules    Clinically benign- to slightly atypical-appearing nevi - the clinically benign- to slightly atypical-appearing nature of the remainder of the patient's nevi was discussed with the patient today.  None of the patient's nevi, with the exception of the 2 noted above, meet threshold for biopsy today.  I emphasized the importance of performing monthly self-skin exams using the ABCDs of monitoring moles, which were reviewed with the patient today and an informational hand-out provided.  I also emphasized the importance of sun avoidance and sun protection with daily sunscreen use.    2. Neoplasm of uncertain behavior of skin (3)  Right Mid-Lower Back  Pink scar at recent biopsy site          Shave removal    Lesion length (cm):   1.2  Margin per side (cm):  0  Lesion diameter (cm):  1.2  Informed consent: discussed and consent obtained    Timeout: patient name, date of birth, surgical site, and procedure verified    Procedure prep:  Patient was prepped and draped  Anesthesia: the lesion was anesthetized in a standard fashion    Anesthetic:  1% lidocaine w/ epinephrine 1-100,000 local infiltration  Instrument used: flexible razor blade    Hemostasis achieved with: aluminum chloride    Outcome: patient tolerated procedure well    Post-procedure details: sterile dressing applied and wound care instructions given    Dressing type: bandage and petrolatum      Staff Communication: Dermatology Local Anesthesia: 1 % Lidocaine / Epinephrine - Amount:0.5ml    Specimen 1 - Dermatopathology- DERM LAB  Differential Diagnosis: DN; re-shave  Check Margins Yes/No?:    Comments:    Dermpath Lab: Routine Histopathology (formalin-fixed tissue)    Left Mid-Back  6 mm dark brown pigmented, asymmetric macule with an asymmetric pigment network and irregular borders           Shave removal    Lesion length (cm):  0.6  Margin per side (cm):  0  Lesion diameter (cm):  0.6  Informed consent: discussed and consent obtained    Timeout: patient name, date of birth, surgical site, and procedure verified    Procedure prep:  Patient was prepped and draped  Anesthesia: the lesion was anesthetized in a standard fashion    Anesthetic:  1% lidocaine w/ epinephrine 1-100,000 local infiltration  Instrument used: flexible razor blade    Hemostasis achieved with: aluminum chloride    Outcome: patient tolerated procedure well    Post-procedure details: sterile dressing applied and wound care instructions given    Dressing type: bandage and petrolatum      Staff Communication: Dermatology Local Anesthesia: 1 % Lidocaine / Epinephrine - Amount:0.5ml    Specimen 2 - Dermatopathology- DERM LAB  Differential Diagnosis: DN  Check Margins Yes/No?:    Comments:    Dermpath Lab: Routine  Histopathology (formalin-fixed tissue)    Right Upper Back  8 mm dark brown pigmented, asymmetric macule with an asymmetric pigment network and irregular borders           Shave removal    Lesion length (cm):  0.8  Margin per side (cm):  0.2  Lesion diameter (cm):  1.2  Informed consent: discussed and consent obtained    Timeout: patient name, date of birth, surgical site, and procedure verified    Procedure prep:  Patient was prepped and draped  Anesthesia: the lesion was anesthetized in a standard fashion    Anesthetic:  1% lidocaine w/ epinephrine 1-100,000 local infiltration  Instrument used: flexible razor blade    Hemostasis achieved with: aluminum chloride    Outcome: patient tolerated procedure well    Post-procedure details: sterile dressing applied and wound care instructions given    Dressing type: bandage and petrolatum      Staff Communication: Dermatology Local Anesthesia: 1 % Lidocaine / Epinephrine - Amount:0.5ml    Specimen 3 - Dermatopathology- DERM LAB  Differential Diagnosis: DN v lentigo v AMH v pigmented SCCIS  Check Margins Yes/No?:    Comments:    Dermpath Lab: Routine Histopathology (formalin-fixed tissue)    Biopsy-proven mildly dysplastic junctional nevus - right mid lower back, present on the deep margin.  The atypical nature of this dysplastic nevus, its presence on the deep margin, and management options were discussed extensively with the patient in the office today.  Given the dysplastic nature of this nevus and its presence on the margin, I recommend re-excision of this lesion to ensure complete removal.  After discussing the risks and benefits of various options for excision, including horizontal removal via shave technique versus full-thickness surgical re-excision, the patient expressed understanding and wishes to undergo horizontal removal of this lesion via shave technique today.    Atypical-appearing pigmented macules -left mid back and right upper back.  I recommend excisional  biopsy of these 2 nevi by shave technique today in order to rule out atypical melanocytic lesions.  The patient expressed understanding, is in agreement with this plan, and wishes to proceed with the biopsy today.    Related Procedures  Follow Up In Dermatology    3. Seborrheic keratosis  Scattered on the patient's face, neck, trunk, and extremities, there are multiple tan- to light brown-colored, hyperkeratotic, stuck-on appearing papules of varying size and shape    Seborrheic Keratoses - the benign nature of these lesions was discussed with the patient today and reassurance provided.  No treatment is medically indicated for these lesions at this time.    4. Hemangioma of skin  Scattered on the patient's face, neck, trunk, and extremities, there are multiple small, round, cherry red- to purplish-colored, symmetric, uniform, vascular-appearing macules and papules    Cherry Angiomas - the benign nature of these vascular lesions was discussed with the patient today and reassurance provided.  No treatment is medically indicated for these lesions at this time.    5. Intertrigo  Left Inframammary Fold  In the patient's bilateral infra-mammary folds, there are red, moist, slightly scaly patches located in skin folds.    Intertrigo -flare in bilateral infra-mammary folds.  The potentially chronic and intermittently flaring nature of this condition, which likely involves colonization with Candidal yeast, and treatment options were discussed extensively with the patient today.  At this time, I recommend topical anti-fungal therapy with Ketoconazole 2% cream, which the patient was instructed to apply twice daily to the affected areas for the next 2-3 weeks, followed by taper to weekends only with use of over-the-counter Zeasorb AF powder once daily for maintenance; the patient may repeat treatment in a similar fashion as needed for future flares.  The risks, benefits, and side effects of these medications were discussed.   The patient expressed understanding and is in agreement with this plan.    6. Folliculitis  Mid Back  Scattered on the patient's back, there are several follicular-based erythematous, inflammatory papules and pustules    Folliculitis - back.  The bacterial nature of this condition and treatment options were discussed with the patient today.  At this time, I recommend topical antibiotic therapy with Clindamycin 1% lotion, which the patient was instructed to apply twice daily to the affected areas or up to 3-4 times per day as needed for active lesions.  The risks, benefits, and side effects of this medication were discussed.  The patient expressed understanding and is in agreement with this plan.    7. History of nonmelanoma skin cancer  On the patient's mid lower back and left lateral upper back, there are well-healed scars with no evidence of recurrent growth on exam today.    History of basal cell carcinoma and dysplastic nevi and photodamage.  There is no evidence of recurrence on exam today.  The signs and symptoms of skin cancer were reviewed and the patient was advised to practice sun protection and sun avoidance, use daily sunscreen, and perform regular self skin exams.  I will have the patient return to our office in 6 months, pending the above biopsy results, for routine follow-up and skin exam, and the patient was instructed to call our office should the patient notice any new, changing, symptomatic, or otherwise concerning skin lesions before then.  The patient expressed understanding and is in agreement with this plan.    8. Diffuse photodamage of skin  Photodistributed  Diffuse photodamage with actinic changes with telangiectasia and mottled pigmentation in sun-exposed areas.    Photodamage.  The signs and symptoms of skin cancer were reviewed and the patient was advised to practice sun protection and sun avoidance, use daily sunscreen, and perform regular self skin exams.  Sun protection was discussed,  including avoiding the mid-day sun, wearing a sunscreen with SPF at least 50, and stressing the need for reapplication of sunscreen and applying more than they think they need.

## 2024-05-03 LAB
LAB AP ASR DISCLAIMER: NORMAL
LABORATORY COMMENT REPORT: NORMAL
PATH REPORT.FINAL DX SPEC: NORMAL
PATH REPORT.GROSS SPEC: NORMAL
PATH REPORT.MICROSCOPIC SPEC OTHER STN: NORMAL
PATH REPORT.RELEVANT HX SPEC: NORMAL
PATH REPORT.TOTAL CANCER: NORMAL

## 2024-05-26 DIAGNOSIS — M06.031 RHEUMATOID ARTHRITIS INVOLVING RIGHT WRIST WITH NEGATIVE RHEUMATOID FACTOR (MULTI): Primary | ICD-10-CM

## 2024-05-28 RX ORDER — METHOTREXATE 2.5 MG/1
TABLET ORAL
Qty: 40 TABLET | Refills: 8 | Status: SHIPPED | OUTPATIENT
Start: 2024-05-28

## 2024-06-17 DIAGNOSIS — M06.031 RHEUMATOID ARTHRITIS INVOLVING RIGHT WRIST WITH NEGATIVE RHEUMATOID FACTOR (MULTI): Primary | ICD-10-CM

## 2024-06-17 RX ORDER — FOLIC ACID 1 MG/1
1 TABLET ORAL DAILY
Qty: 90 TABLET | Refills: 3 | Status: SHIPPED | OUTPATIENT
Start: 2024-06-17 | End: 2025-06-17

## 2024-06-29 DIAGNOSIS — M06.031 RHEUMATOID ARTHRITIS INVOLVING RIGHT WRIST WITH NEGATIVE RHEUMATOID FACTOR (MULTI): Primary | ICD-10-CM

## 2024-07-01 RX ORDER — UPADACITINIB 15 MG/1
15 TABLET, EXTENDED RELEASE ORAL DAILY
Qty: 30 TABLET | Refills: 1 | Status: SHIPPED | OUTPATIENT
Start: 2024-07-01

## 2024-07-18 ENCOUNTER — LAB (OUTPATIENT)
Dept: LAB | Facility: LAB | Age: 54
End: 2024-07-18
Payer: COMMERCIAL

## 2024-07-18 DIAGNOSIS — M06.031 RHEUMATOID ARTHRITIS INVOLVING RIGHT WRIST WITH NEGATIVE RHEUMATOID FACTOR (MULTI): ICD-10-CM

## 2024-07-18 LAB
ALBUMIN SERPL BCP-MCNC: 4.6 G/DL (ref 3.4–5)
ALP SERPL-CCNC: 52 U/L (ref 33–110)
ALT SERPL W P-5'-P-CCNC: 25 U/L (ref 7–45)
ANION GAP SERPL CALC-SCNC: 12 MMOL/L (ref 10–20)
AST SERPL W P-5'-P-CCNC: 21 U/L (ref 9–39)
BILIRUB SERPL-MCNC: 0.5 MG/DL (ref 0–1.2)
BUN SERPL-MCNC: 17 MG/DL (ref 6–23)
CALCIUM SERPL-MCNC: 9.7 MG/DL (ref 8.6–10.6)
CHLORIDE SERPL-SCNC: 104 MMOL/L (ref 98–107)
CO2 SERPL-SCNC: 27 MMOL/L (ref 21–32)
CREAT SERPL-MCNC: 0.68 MG/DL (ref 0.5–1.05)
CRP SERPL-MCNC: 0.28 MG/DL
EGFRCR SERPLBLD CKD-EPI 2021: >90 ML/MIN/1.73M*2
ERYTHROCYTE [DISTWIDTH] IN BLOOD BY AUTOMATED COUNT: 13.5 % (ref 11.5–14.5)
ERYTHROCYTE [SEDIMENTATION RATE] IN BLOOD BY WESTERGREN METHOD: 11 MM/H (ref 0–30)
GLUCOSE SERPL-MCNC: 100 MG/DL (ref 74–99)
HCT VFR BLD AUTO: 37.8 % (ref 36–46)
HGB BLD-MCNC: 12.7 G/DL (ref 12–16)
MCH RBC QN AUTO: 31.8 PG (ref 26–34)
MCHC RBC AUTO-ENTMCNC: 33.6 G/DL (ref 32–36)
MCV RBC AUTO: 95 FL (ref 80–100)
NRBC BLD-RTO: 0 /100 WBCS (ref 0–0)
PLATELET # BLD AUTO: 250 X10*3/UL (ref 150–450)
POTASSIUM SERPL-SCNC: 4.1 MMOL/L (ref 3.5–5.3)
PROT SERPL-MCNC: 7 G/DL (ref 6.4–8.2)
RBC # BLD AUTO: 3.99 X10*6/UL (ref 4–5.2)
SODIUM SERPL-SCNC: 139 MMOL/L (ref 136–145)
WBC # BLD AUTO: 5.6 X10*3/UL (ref 4.4–11.3)

## 2024-07-18 PROCEDURE — 86140 C-REACTIVE PROTEIN: CPT

## 2024-07-18 PROCEDURE — 85652 RBC SED RATE AUTOMATED: CPT

## 2024-07-18 PROCEDURE — 36415 COLL VENOUS BLD VENIPUNCTURE: CPT

## 2024-07-18 PROCEDURE — 80053 COMPREHEN METABOLIC PANEL: CPT

## 2024-07-18 PROCEDURE — 85027 COMPLETE CBC AUTOMATED: CPT

## 2024-07-26 ENCOUNTER — OFFICE VISIT (OUTPATIENT)
Dept: RHEUMATOLOGY | Facility: CLINIC | Age: 54
End: 2024-07-26
Payer: COMMERCIAL

## 2024-07-26 VITALS
OXYGEN SATURATION: 100 % | WEIGHT: 175.2 LBS | BODY MASS INDEX: 29.19 KG/M2 | SYSTOLIC BLOOD PRESSURE: 111 MMHG | HEART RATE: 73 BPM | HEIGHT: 65 IN | DIASTOLIC BLOOD PRESSURE: 83 MMHG

## 2024-07-26 DIAGNOSIS — M06.031 RHEUMATOID ARTHRITIS INVOLVING RIGHT WRIST WITH NEGATIVE RHEUMATOID FACTOR (MULTI): Primary | ICD-10-CM

## 2024-07-26 DIAGNOSIS — Z79.899 ENCOUNTER FOR LONG-TERM (CURRENT) USE OF MEDICATIONS: ICD-10-CM

## 2024-07-26 PROCEDURE — 99213 OFFICE O/P EST LOW 20 MIN: CPT | Performed by: INTERNAL MEDICINE

## 2024-07-26 PROCEDURE — 3008F BODY MASS INDEX DOCD: CPT | Performed by: INTERNAL MEDICINE

## 2024-07-26 ASSESSMENT — ENCOUNTER SYMPTOMS
DIZZINESS: 0
HEADACHES: 0
DYSURIA: 0
ABDOMINAL PAIN: 0
ADENOPATHY: 0
SHORTNESS OF BREATH: 0
SLEEP DISTURBANCE: 0
FATIGUE: 0

## 2024-07-26 NOTE — PROGRESS NOTES
Subjective   Patient ID: Adriana Lew is a 53 y.o. female who presents for Follow-up (6 mo fuv).  HPI  Patient with history of seronegative inflammatory arthritis.  Initially was in the right hand and now on the left hand.  Previous medications have included Xeljanz.    We had last seen her in January and at that time we had her continue with Rinvoq and methotrexate.  In the interim she did have shingles by the right lower extremity proximally.  No other infections.    Last week she did have a flare of her right wrist in which it was swollen and she had difficulty with range of motion and it did last for a while.  Is fine today.  Her left hand has been okay.  Has been compliant with medications.    Has not had any new skin lesions since last seen.  Review of Systems   Constitutional:  Negative for fatigue.   HENT:  Negative for mouth sores.    Eyes:  Negative for visual disturbance.   Respiratory:  Negative for shortness of breath.    Cardiovascular:  Negative for chest pain.   Gastrointestinal:  Negative for abdominal pain.   Genitourinary:  Negative for dysuria.   Musculoskeletal:         Per HPI   Skin:  Negative for rash.   Neurological:  Negative for dizziness and headaches.   Hematological:  Negative for adenopathy.   Psychiatric/Behavioral:  Negative for sleep disturbance.        Objective   Physical Exam  GEN: NAD A&O x3 appropriate affect  EYES: no conjunctival redness  SKIN: no rashes on exposed skin, Band-Aid on lower back  PULSES: +radials  MSK: No swelling in the right DIP PIP MCP wrist.  Some mild warmth in the right wrist compared to the left but no tenderness.  No swelling in the left wrist.    Assessment/Plan        Positive REAGAN with inflammatory arthritis now in the left hand.   -Has been on methotrexate and Rinvoq.  Had recent flare of her right wrist which was her original presenting symptom years ago.    Will continue to follow.  Reviewed her recent blood work.    Will see her back again in 6  months or as needed  Loretta Rivers MD 07/26/24 8:43 AM

## 2024-08-28 DIAGNOSIS — M06.031 RHEUMATOID ARTHRITIS INVOLVING RIGHT WRIST WITH NEGATIVE RHEUMATOID FACTOR (MULTI): ICD-10-CM

## 2024-09-27 ENCOUNTER — HOSPITAL ENCOUNTER (OUTPATIENT)
Dept: RADIOLOGY | Facility: CLINIC | Age: 54
End: 2024-09-27
Payer: COMMERCIAL

## 2024-09-30 ENCOUNTER — HOSPITAL ENCOUNTER (OUTPATIENT)
Dept: RADIOLOGY | Facility: CLINIC | Age: 54
Discharge: HOME | End: 2024-09-30
Payer: COMMERCIAL

## 2024-09-30 VITALS — HEIGHT: 65 IN | WEIGHT: 167 LBS | BODY MASS INDEX: 27.82 KG/M2

## 2024-09-30 DIAGNOSIS — Z01.419 ENCOUNTER FOR GYNECOLOGICAL EXAMINATION WITHOUT ABNORMAL FINDING: ICD-10-CM

## 2024-09-30 PROCEDURE — 77067 SCR MAMMO BI INCL CAD: CPT | Performed by: RADIOLOGY

## 2024-09-30 PROCEDURE — 77063 BREAST TOMOSYNTHESIS BI: CPT | Performed by: RADIOLOGY

## 2024-09-30 PROCEDURE — 77067 SCR MAMMO BI INCL CAD: CPT

## 2024-10-31 ENCOUNTER — APPOINTMENT (OUTPATIENT)
Dept: DERMATOLOGY | Facility: CLINIC | Age: 54
End: 2024-10-31
Payer: COMMERCIAL

## 2024-10-31 DIAGNOSIS — L21.9 SEBORRHEIC DERMATITIS: ICD-10-CM

## 2024-10-31 DIAGNOSIS — L57.8 DIFFUSE PHOTODAMAGE OF SKIN: ICD-10-CM

## 2024-10-31 DIAGNOSIS — L82.1 SEBORRHEIC KERATOSIS: ICD-10-CM

## 2024-10-31 DIAGNOSIS — D22.5 MELANOCYTIC NEVUS OF TRUNK: ICD-10-CM

## 2024-10-31 DIAGNOSIS — D48.5 NEOPLASM OF UNCERTAIN BEHAVIOR OF SKIN: Primary | ICD-10-CM

## 2024-10-31 DIAGNOSIS — L57.0 ACTINIC KERATOSIS: ICD-10-CM

## 2024-10-31 DIAGNOSIS — Z85.828 HISTORY OF NONMELANOMA SKIN CANCER: ICD-10-CM

## 2024-10-31 DIAGNOSIS — D18.01 HEMANGIOMA OF SKIN: ICD-10-CM

## 2025-01-10 ENCOUNTER — LAB (OUTPATIENT)
Dept: LAB | Facility: LAB | Age: 55
End: 2025-01-10
Payer: COMMERCIAL

## 2025-01-10 DIAGNOSIS — M06.031 RHEUMATOID ARTHRITIS INVOLVING RIGHT WRIST WITH NEGATIVE RHEUMATOID FACTOR (MULTI): ICD-10-CM

## 2025-01-10 LAB
ALBUMIN SERPL BCP-MCNC: 4.4 G/DL (ref 3.4–5)
ALP SERPL-CCNC: 64 U/L (ref 33–110)
ALT SERPL W P-5'-P-CCNC: 20 U/L (ref 7–45)
ANION GAP SERPL CALC-SCNC: 14 MMOL/L (ref 10–20)
AST SERPL W P-5'-P-CCNC: 16 U/L (ref 9–39)
BILIRUB SERPL-MCNC: 0.5 MG/DL (ref 0–1.2)
BUN SERPL-MCNC: 15 MG/DL (ref 6–23)
CALCIUM SERPL-MCNC: 9.3 MG/DL (ref 8.6–10.6)
CHLORIDE SERPL-SCNC: 101 MMOL/L (ref 98–107)
CO2 SERPL-SCNC: 27 MMOL/L (ref 21–32)
CREAT SERPL-MCNC: 0.61 MG/DL (ref 0.5–1.05)
CRP SERPL-MCNC: 0.73 MG/DL
EGFRCR SERPLBLD CKD-EPI 2021: >90 ML/MIN/1.73M*2
ERYTHROCYTE [DISTWIDTH] IN BLOOD BY AUTOMATED COUNT: 13.2 % (ref 11.5–14.5)
ERYTHROCYTE [SEDIMENTATION RATE] IN BLOOD BY WESTERGREN METHOD: 12 MM/H (ref 0–30)
GLUCOSE SERPL-MCNC: 86 MG/DL (ref 74–99)
HCT VFR BLD AUTO: 40.8 % (ref 36–46)
HGB BLD-MCNC: 13.4 G/DL (ref 12–16)
MCH RBC QN AUTO: 30.6 PG (ref 26–34)
MCHC RBC AUTO-ENTMCNC: 32.8 G/DL (ref 32–36)
MCV RBC AUTO: 93 FL (ref 80–100)
NRBC BLD-RTO: 0 /100 WBCS (ref 0–0)
PLATELET # BLD AUTO: 280 X10*3/UL (ref 150–450)
POTASSIUM SERPL-SCNC: 4.2 MMOL/L (ref 3.5–5.3)
PROT SERPL-MCNC: 7 G/DL (ref 6.4–8.2)
RBC # BLD AUTO: 4.38 X10*6/UL (ref 4–5.2)
SODIUM SERPL-SCNC: 138 MMOL/L (ref 136–145)
WBC # BLD AUTO: 6.2 X10*3/UL (ref 4.4–11.3)

## 2025-01-10 PROCEDURE — 80053 COMPREHEN METABOLIC PANEL: CPT

## 2025-01-10 PROCEDURE — 85027 COMPLETE CBC AUTOMATED: CPT

## 2025-01-10 PROCEDURE — 85652 RBC SED RATE AUTOMATED: CPT

## 2025-01-10 PROCEDURE — 86140 C-REACTIVE PROTEIN: CPT

## 2025-01-24 ENCOUNTER — APPOINTMENT (OUTPATIENT)
Dept: RHEUMATOLOGY | Facility: CLINIC | Age: 55
End: 2025-01-24
Payer: COMMERCIAL

## 2025-01-24 DIAGNOSIS — Z79.899 ENCOUNTER FOR LONG-TERM (CURRENT) USE OF MEDICATIONS: ICD-10-CM

## 2025-01-24 DIAGNOSIS — M06.031 RHEUMATOID ARTHRITIS INVOLVING RIGHT WRIST WITH NEGATIVE RHEUMATOID FACTOR (MULTI): Primary | ICD-10-CM

## 2025-01-30 DIAGNOSIS — M06.031 RHEUMATOID ARTHRITIS INVOLVING RIGHT WRIST WITH NEGATIVE RHEUMATOID FACTOR (MULTI): ICD-10-CM

## 2025-01-30 RX ORDER — UPADACITINIB 15 MG/1
15 TABLET, EXTENDED RELEASE ORAL DAILY
Qty: 90 TABLET | Refills: 1 | Status: SHIPPED | OUTPATIENT
Start: 2025-01-30

## 2025-03-18 DIAGNOSIS — N91.2 AMENORRHEA: Primary | ICD-10-CM

## 2025-03-22 ENCOUNTER — HOSPITAL ENCOUNTER (OUTPATIENT)
Dept: RADIOLOGY | Facility: HOSPITAL | Age: 55
Discharge: HOME | End: 2025-03-22
Payer: COMMERCIAL

## 2025-03-22 DIAGNOSIS — E78.5 HYPERLIPIDEMIA, UNSPECIFIED: ICD-10-CM

## 2025-03-22 PROCEDURE — 75571 CT HRT W/O DYE W/CA TEST: CPT

## 2025-03-26 ENCOUNTER — APPOINTMENT (OUTPATIENT)
Dept: OBSTETRICS AND GYNECOLOGY | Facility: CLINIC | Age: 55
End: 2025-03-26
Payer: COMMERCIAL

## 2025-03-26 VITALS
SYSTOLIC BLOOD PRESSURE: 100 MMHG | DIASTOLIC BLOOD PRESSURE: 70 MMHG | HEIGHT: 65 IN | WEIGHT: 168 LBS | BODY MASS INDEX: 27.99 KG/M2

## 2025-03-26 DIAGNOSIS — Z01.419 ENCOUNTER FOR GYNECOLOGICAL EXAMINATION WITHOUT ABNORMAL FINDING: Primary | ICD-10-CM

## 2025-03-26 LAB
FSH SERPL-ACNC: 72.9 MIU/ML
LH SERPL-ACNC: 35.9 MIU/ML

## 2025-03-26 PROCEDURE — 88175 CYTOPATH C/V AUTO FLUID REDO: CPT

## 2025-03-26 PROCEDURE — 87624 HPV HI-RISK TYP POOLED RSLT: CPT

## 2025-03-26 PROCEDURE — 3008F BODY MASS INDEX DOCD: CPT | Performed by: OBSTETRICS & GYNECOLOGY

## 2025-03-26 PROCEDURE — 1036F TOBACCO NON-USER: CPT | Performed by: OBSTETRICS & GYNECOLOGY

## 2025-03-26 PROCEDURE — 99396 PREV VISIT EST AGE 40-64: CPT | Performed by: OBSTETRICS & GYNECOLOGY

## 2025-03-26 RX ORDER — ESTRADIOL 0.03 MG/D
1 FILM, EXTENDED RELEASE TRANSDERMAL 2 TIMES WEEKLY
Qty: 24 PATCH | Refills: 3 | Status: SHIPPED | OUTPATIENT
Start: 2025-03-27 | End: 2026-03-27

## 2025-03-26 RX ORDER — PROGESTERONE 100 MG/1
100 CAPSULE ORAL NIGHTLY
Qty: 90 CAPSULE | Refills: 3 | Status: SHIPPED | OUTPATIENT
Start: 2025-03-26 | End: 2026-03-26

## 2025-03-26 NOTE — PROGRESS NOTES
Subjective   Adriana Lew is a 54 y.o. female here for a routine exam.  She stopped her birth control about a year ago and has been amenorrheic since.  There is no postmenopausal bleeding or discharge.  No dysuria or change in bowel habits.  She is current on her colonoscopy.    She does fine her biggest menopausal concern is joint pain especially in her left hip and forearm.  She is followed by Rheumatology.  Personal health questionnaire reviewed: yes.     Gynecologic History  No LMP recorded (lmp unknown). Patient is postmenopausal.  Contraception: post menopausal status  Last Pap: 22. Results were: normal  Last mammogram: 24. Results were: normal    Obstetric History  OB History    Para Term  AB Living   0 0 0         SAB IAB Ectopic Multiple Live Births                   Objective   Constitutional: Alert and in no acute distress. Well developed, well nourished.   Head and Face: Head and face: Normal.    Eyes: Normal external exam - nonicteric sclera, extraocular movements intact (EOMI) and no ptosis.   Neck: No neck asymmetry. Supple. Thyroid not enlarged and there were no palpable thyroid nodules.    Pulmonary: No respiratory distress.   Chest: Breasts: Normal appearance, no nipple discharge and no skin changes. Palpation of breasts and axillae: No palpable mass and no axillary lymphadenopathy.   Abdomen: Soft nontender; no abdominal mass palpated. No organomegaly. No hernias.   Genitourinary: External genitalia: Normal. No inguinal lymphadenopathy. Bartholin's Urethral and Skenes Glands: Normal. Urethra: Normal.  Bladder: Normal on palpation. Vagina: Normal. Cervix: Normal.  Uterus: Normal.  Right Adnexa/parametria: Normal.  Left Adnexa/parametria: Normal.  Inspection of Perianal Area: Normal.   Musculoskeletal: No joint swelling seen, normal movements of all extremities.   Skin: Normal skin color and pigmentation, normal skin turgor, and no rash.   Neurologic: Non-focal. Grossly  intact.   Psychiatric: Alert and oriented x 3. Affect normal to patient baseline. Mood: Appropriate.  Physical Exam     Assessment/Plan   Healthy female exam.  This is a 54-year-old female with a normal exam.  A Pap smear was sent.    Her routine mammogram will be due in September 2025.    We discussed her menopausal symptoms, especially joint pain.  She will begin estradiol patch at 0.025 mg with 100 mg of progesterone at bedtime.  We discussed it could take 4 to 6 weeks for the full effect.  An FSH/LH  was obtained today, which is consistent with menopause level.    She will call me with any concerns, routine follow-up is in 1 year.  Education reviewed: self breast exams.  Contraception: post menopausal status.  Mammogram ordered.

## 2025-03-27 ENCOUNTER — OFFICE VISIT (OUTPATIENT)
Dept: RHEUMATOLOGY | Facility: CLINIC | Age: 55
End: 2025-03-27
Payer: COMMERCIAL

## 2025-03-27 VITALS
BODY MASS INDEX: 27.26 KG/M2 | DIASTOLIC BLOOD PRESSURE: 71 MMHG | OXYGEN SATURATION: 100 % | HEART RATE: 68 BPM | WEIGHT: 163.8 LBS | SYSTOLIC BLOOD PRESSURE: 103 MMHG

## 2025-03-27 DIAGNOSIS — M06.031 RHEUMATOID ARTHRITIS INVOLVING RIGHT WRIST WITH NEGATIVE RHEUMATOID FACTOR (MULTI): Primary | ICD-10-CM

## 2025-03-27 PROCEDURE — 99212 OFFICE O/P EST SF 10 MIN: CPT | Performed by: INTERNAL MEDICINE

## 2025-03-27 PROCEDURE — 1036F TOBACCO NON-USER: CPT | Performed by: INTERNAL MEDICINE

## 2025-03-27 ASSESSMENT — ENCOUNTER SYMPTOMS
FATIGUE: 0
DIZZINESS: 0
ADENOPATHY: 0
LOSS OF SENSATION IN FEET: 0
SHORTNESS OF BREATH: 0
DYSURIA: 0
SLEEP DISTURBANCE: 0
OCCASIONAL FEELINGS OF UNSTEADINESS: 0
DEPRESSION: 0
ABDOMINAL PAIN: 0
HEADACHES: 0

## 2025-03-27 ASSESSMENT — PATIENT HEALTH QUESTIONNAIRE - PHQ9
SUM OF ALL RESPONSES TO PHQ9 QUESTIONS 1 AND 2: 0
2. FEELING DOWN, DEPRESSED OR HOPELESS: NOT AT ALL
1. LITTLE INTEREST OR PLEASURE IN DOING THINGS: NOT AT ALL

## 2025-03-27 NOTE — PROGRESS NOTES
Subjective   Patient ID: Adriana Lew is a 54 y.o. female who presents for Follow-up.  HPI  Patient with history of seronegative inflammatory arthritis.  Initially was in the right hand and now on the left hand.  Previous medications have included Xeljanz.    Patient was last seen in July and at that time we had her continue on Rinvoq and methotrexate.  She had stopped Her medications around October/November because of infection and she was on antibiotics.  In January she had the flu and then she had the shingles again.  Has not been on medications since that time.  She does not feel that her joint symptoms have deferred all too much.  She has a little bit of fullness and has a little bit of annoyance doing certain motions with her thumb but otherwise no problems.  No problems with her right hand or wrist.  She did her physical for insurance and has a little bit of hyperlipidemia.  She is perimenopausal.  Had 0 on her cardiac calcium scoring.    Review of Systems   Constitutional:  Negative for fatigue.   HENT:  Negative for mouth sores.    Eyes:  Negative for visual disturbance.   Respiratory:  Negative for shortness of breath.    Cardiovascular:  Negative for chest pain.   Gastrointestinal:  Negative for abdominal pain.   Genitourinary:  Negative for dysuria.   Musculoskeletal:         Per HPI   Skin:  Negative for rash.   Neurological:  Negative for dizziness and headaches.   Hematological:  Negative for adenopathy.   Psychiatric/Behavioral:  Negative for sleep disturbance.        Objective   Physical Exam  GEN: NAD A&O x3 appropriate affect  EYES: no conjunctival redness  SKIN: no rashes on exposed skin, Band-Aid on lower back  PULSES: +radials  MSK: Distal mild fullness in the left wrist but no tenderness no swelling or tenderness in the right wrist  Assessment/Plan        Positive REAGAN with inflammatory arthritis .   -Has been off of medications since October/November 2024 and has been stable.  I feel she  will always have a little bit of fullness in the left wrist where she had her surgery.  Will keep her off medications.  Reviewed her blood work from January and we will have her come back as needed.   Loretta Rivers MD 03/27/25 8:37 AM

## 2025-04-17 ENCOUNTER — APPOINTMENT (OUTPATIENT)
Dept: DERMATOLOGY | Facility: CLINIC | Age: 55
End: 2025-04-17
Payer: COMMERCIAL

## 2025-05-13 ENCOUNTER — APPOINTMENT (OUTPATIENT)
Dept: DERMATOLOGY | Facility: CLINIC | Age: 55
End: 2025-05-13
Payer: COMMERCIAL

## 2025-11-03 ENCOUNTER — APPOINTMENT (OUTPATIENT)
Dept: DERMATOLOGY | Facility: CLINIC | Age: 55
End: 2025-11-03
Payer: COMMERCIAL

## 2026-04-29 ENCOUNTER — APPOINTMENT (OUTPATIENT)
Dept: OBSTETRICS AND GYNECOLOGY | Facility: CLINIC | Age: 56
End: 2026-04-29
Payer: COMMERCIAL